# Patient Record
Sex: MALE | Race: WHITE | Employment: UNEMPLOYED | RURAL
[De-identification: names, ages, dates, MRNs, and addresses within clinical notes are randomized per-mention and may not be internally consistent; named-entity substitution may affect disease eponyms.]

---

## 2018-01-01 ENCOUNTER — HOSPITAL ENCOUNTER (INPATIENT)
Facility: HOSPITAL | Age: 78
LOS: 1 days | DRG: 194 | End: 2018-09-09
Attending: FAMILY MEDICINE | Admitting: INTERNAL MEDICINE
Payer: MEDICARE

## 2018-01-01 ENCOUNTER — APPOINTMENT (OUTPATIENT)
Dept: GENERAL RADIOLOGY | Facility: HOSPITAL | Age: 78
DRG: 194 | End: 2018-01-01
Payer: MEDICARE

## 2018-01-01 VITALS
DIASTOLIC BLOOD PRESSURE: 47 MMHG | TEMPERATURE: 96 F | SYSTOLIC BLOOD PRESSURE: 68 MMHG | OXYGEN SATURATION: 97 % | RESPIRATION RATE: 8 BRPM

## 2018-01-01 DIAGNOSIS — J18.9 PNEUMONIA DUE TO ORGANISM: Primary | ICD-10-CM

## 2018-01-01 LAB
A/G RATIO: 1 (ref 0.8–2)
ALBUMIN SERPL-MCNC: 3.6 G/DL (ref 3.4–4.8)
ALP BLD-CCNC: 73 U/L (ref 25–100)
ALT SERPL-CCNC: 8 U/L (ref 4–36)
ANION GAP SERPL CALCULATED.3IONS-SCNC: 12 MMOL/L (ref 3–16)
AST SERPL-CCNC: 16 U/L (ref 8–33)
BASE EXCESS ARTERIAL: 1.2 MMOL/L (ref -3–3)
BASOPHILS ABSOLUTE: 0.1 K/UL (ref 0–0.1)
BASOPHILS RELATIVE PERCENT: 0.3 %
BILIRUB SERPL-MCNC: <0.2 MG/DL (ref 0.3–1.2)
BILIRUBIN URINE: NEGATIVE
BLOOD, URINE: NEGATIVE
BUN BLDV-MCNC: 28 MG/DL (ref 6–20)
CALCIUM SERPL-MCNC: 9 MG/DL (ref 8.5–10.5)
CHLORIDE BLD-SCNC: 105 MMOL/L (ref 98–107)
CLARITY: CLEAR
CO2: 28 MMOL/L (ref 20–30)
COLOR: YELLOW
CREAT SERPL-MCNC: 1.5 MG/DL (ref 0.4–1.2)
EOSINOPHILS ABSOLUTE: 0.3 K/UL (ref 0–0.4)
EOSINOPHILS RELATIVE PERCENT: 1.1 %
GFR AFRICAN AMERICAN: 55
GFR NON-AFRICAN AMERICAN: 45
GLOBULIN: 3.6 G/DL
GLUCOSE BLD-MCNC: 103 MG/DL (ref 74–106)
GLUCOSE URINE: NEGATIVE MG/DL
HCO3 ARTERIAL: 28.7 MMOL/L (ref 22–26)
HCT VFR BLD CALC: 39.7 % (ref 40–54)
HEMOGLOBIN: 12.1 G/DL (ref 13–18)
IMMATURE GRANULOCYTES #: 0.2 K/UL
IMMATURE GRANULOCYTES %: 0.8 % (ref 0–5)
KETONES, URINE: NEGATIVE MG/DL
LEUKOCYTE ESTERASE, URINE: NEGATIVE
LYMPHOCYTES ABSOLUTE: 1.8 K/UL (ref 1.5–4)
LYMPHOCYTES RELATIVE PERCENT: 6.7 %
MCH RBC QN AUTO: 30.1 PG (ref 27–32)
MCHC RBC AUTO-ENTMCNC: 30.5 G/DL (ref 31–35)
MCV RBC AUTO: 98.8 FL (ref 80–100)
MICROSCOPIC EXAMINATION: YES
MONOCYTES ABSOLUTE: 1.4 K/UL (ref 0.2–0.8)
MONOCYTES RELATIVE PERCENT: 5.2 %
NEUTROPHILS ABSOLUTE: 23.5 K/UL (ref 2–7.5)
NEUTROPHILS RELATIVE PERCENT: 85.9 %
NITRITE, URINE: NEGATIVE
O2 SAT, ARTERIAL: 99.4 %
O2 THERAPY: ABNORMAL
PCO2 ARTERIAL: 59.4 MMHG (ref 35–45)
PDW BLD-RTO: 14.6 % (ref 11–16)
PH ARTERIAL: 7.3 (ref 7.35–7.45)
PH UA: 7
PLATELET # BLD: 286 K/UL (ref 150–400)
PMV BLD AUTO: 9.8 FL (ref 6–10)
PO2 ARTERIAL: 185.9 MMHG (ref 80–100)
POTASSIUM SERPL-SCNC: 3.6 MMOL/L (ref 3.4–5.1)
PROTEIN UA: ABNORMAL MG/DL
RBC # BLD: 4.02 M/UL (ref 4.5–6)
RBC UA: NORMAL /HPF (ref 0–2)
SODIUM BLD-SCNC: 145 MMOL/L (ref 136–145)
SPECIFIC GRAVITY UA: 1.02
TCO2 ARTERIAL: 30.5 MMOL/L
TOTAL PROTEIN: 7.2 G/DL (ref 6.4–8.3)
URINE REFLEX TO CULTURE: ABNORMAL
URINE TYPE: ABNORMAL
UROBILINOGEN, URINE: 0.2 E.U./DL
WBC # BLD: 27.3 K/UL (ref 4–11)
WBC UA: NORMAL /HPF (ref 0–5)

## 2018-01-01 PROCEDURE — 80053 COMPREHEN METABOLIC PANEL: CPT

## 2018-01-01 PROCEDURE — 81001 URINALYSIS AUTO W/SCOPE: CPT

## 2018-01-01 PROCEDURE — 93005 ELECTROCARDIOGRAM TRACING: CPT

## 2018-01-01 PROCEDURE — 96375 TX/PRO/DX INJ NEW DRUG ADDON: CPT

## 2018-01-01 PROCEDURE — 94640 AIRWAY INHALATION TREATMENT: CPT

## 2018-01-01 PROCEDURE — 31720 CLEARANCE OF AIRWAYS: CPT

## 2018-01-01 PROCEDURE — 87040 BLOOD CULTURE FOR BACTERIA: CPT

## 2018-01-01 PROCEDURE — 96376 TX/PRO/DX INJ SAME DRUG ADON: CPT

## 2018-01-01 PROCEDURE — 82803 BLOOD GASES ANY COMBINATION: CPT

## 2018-01-01 PROCEDURE — 6360000002 HC RX W HCPCS

## 2018-01-01 PROCEDURE — 36415 COLL VENOUS BLD VENIPUNCTURE: CPT

## 2018-01-01 PROCEDURE — 6370000000 HC RX 637 (ALT 250 FOR IP): Performed by: FAMILY MEDICINE

## 2018-01-01 PROCEDURE — 85025 COMPLETE CBC W/AUTO DIFF WBC: CPT

## 2018-01-01 PROCEDURE — 99285 EMERGENCY DEPT VISIT HI MDM: CPT

## 2018-01-01 PROCEDURE — 94660 CPAP INITIATION&MGMT: CPT

## 2018-01-01 PROCEDURE — 6360000002 HC RX W HCPCS: Performed by: FAMILY MEDICINE

## 2018-01-01 PROCEDURE — 6370000000 HC RX 637 (ALT 250 FOR IP): Performed by: INTERNAL MEDICINE

## 2018-01-01 PROCEDURE — 71045 X-RAY EXAM CHEST 1 VIEW: CPT

## 2018-01-01 PROCEDURE — 1200000000 HC SEMI PRIVATE

## 2018-01-01 PROCEDURE — 96365 THER/PROPH/DIAG IV INF INIT: CPT

## 2018-01-01 PROCEDURE — 99235 HOSP IP/OBS SAME DATE MOD 70: CPT | Performed by: INTERNAL MEDICINE

## 2018-01-01 PROCEDURE — 6360000002 HC RX W HCPCS: Performed by: INTERNAL MEDICINE

## 2018-01-01 PROCEDURE — 2580000003 HC RX 258: Performed by: FAMILY MEDICINE

## 2018-01-01 RX ORDER — IPRATROPIUM BROMIDE AND ALBUTEROL SULFATE 2.5; .5 MG/3ML; MG/3ML
1 SOLUTION RESPIRATORY (INHALATION) ONCE
Status: COMPLETED | OUTPATIENT
Start: 2018-01-01 | End: 2018-01-01

## 2018-01-01 RX ORDER — IPRATROPIUM BROMIDE AND ALBUTEROL SULFATE 2.5; .5 MG/3ML; MG/3ML
1 SOLUTION RESPIRATORY (INHALATION)
Status: DISCONTINUED | OUTPATIENT
Start: 2018-01-01 | End: 2018-01-01 | Stop reason: HOSPADM

## 2018-01-01 RX ORDER — FLUTICASONE PROPIONATE 50 MCG
1 SPRAY, SUSPENSION (ML) NASAL DAILY
Status: DISCONTINUED | OUTPATIENT
Start: 2018-01-01 | End: 2018-01-01 | Stop reason: HOSPADM

## 2018-01-01 RX ORDER — ONDANSETRON 2 MG/ML
4 INJECTION INTRAMUSCULAR; INTRAVENOUS EVERY 6 HOURS PRN
Status: DISCONTINUED | OUTPATIENT
Start: 2018-01-01 | End: 2018-01-01 | Stop reason: HOSPADM

## 2018-01-01 RX ORDER — KETOROLAC TROMETHAMINE 30 MG/ML
15 INJECTION, SOLUTION INTRAMUSCULAR; INTRAVENOUS ONCE
Status: COMPLETED | OUTPATIENT
Start: 2018-01-01 | End: 2018-01-01

## 2018-01-01 RX ORDER — BUDESONIDE 0.5 MG/2ML
500 INHALANT ORAL 2 TIMES DAILY
Status: DISCONTINUED | OUTPATIENT
Start: 2018-01-01 | End: 2018-01-01 | Stop reason: HOSPADM

## 2018-01-01 RX ORDER — MORPHINE SULFATE 4 MG/ML
INJECTION, SOLUTION INTRAMUSCULAR; INTRAVENOUS
Status: COMPLETED
Start: 2018-01-01 | End: 2018-01-01

## 2018-01-01 RX ORDER — VANCOMYCIN HYDROCHLORIDE 1 G/200ML
1000 INJECTION, SOLUTION INTRAVENOUS ONCE
Status: DISCONTINUED | OUTPATIENT
Start: 2018-01-01 | End: 2018-01-01 | Stop reason: HOSPADM

## 2018-01-01 RX ORDER — LEVOFLOXACIN 5 MG/ML
500 INJECTION, SOLUTION INTRAVENOUS ONCE
Status: COMPLETED | OUTPATIENT
Start: 2018-01-01 | End: 2018-01-01

## 2018-01-01 RX ORDER — SODIUM CHLORIDE 0.9 % (FLUSH) 0.9 %
10 SYRINGE (ML) INJECTION EVERY 12 HOURS SCHEDULED
Status: DISCONTINUED | OUTPATIENT
Start: 2018-01-01 | End: 2018-01-01 | Stop reason: HOSPADM

## 2018-01-01 RX ORDER — SODIUM CHLORIDE 9 MG/ML
INJECTION, SOLUTION INTRAVENOUS CONTINUOUS
Status: DISCONTINUED | OUTPATIENT
Start: 2018-01-01 | End: 2018-01-01

## 2018-01-01 RX ORDER — MORPHINE SULFATE 4 MG/ML
4 INJECTION, SOLUTION INTRAMUSCULAR; INTRAVENOUS ONCE
Status: COMPLETED | OUTPATIENT
Start: 2018-01-01 | End: 2018-01-01

## 2018-01-01 RX ORDER — LORAZEPAM 2 MG/ML
1 INJECTION INTRAMUSCULAR ONCE
Status: COMPLETED | OUTPATIENT
Start: 2018-01-01 | End: 2018-01-01

## 2018-01-01 RX ORDER — SODIUM CHLORIDE 0.9 % (FLUSH) 0.9 %
10 SYRINGE (ML) INJECTION PRN
Status: DISCONTINUED | OUTPATIENT
Start: 2018-01-01 | End: 2018-01-01 | Stop reason: HOSPADM

## 2018-01-01 RX ADMIN — LORAZEPAM 1 MG: 2 INJECTION INTRAMUSCULAR; INTRAVENOUS at 08:56

## 2018-01-01 RX ADMIN — LORAZEPAM 1 MG: 2 INJECTION INTRAMUSCULAR; INTRAVENOUS at 08:00

## 2018-01-01 RX ADMIN — SODIUM CHLORIDE: 9 INJECTION, SOLUTION INTRAVENOUS at 07:44

## 2018-01-01 RX ADMIN — BUDESONIDE 500 MCG: 0.5 SUSPENSION RESPIRATORY (INHALATION) at 13:56

## 2018-01-01 RX ADMIN — LEVOFLOXACIN 500 MG: 5 INJECTION, SOLUTION INTRAVENOUS at 08:58

## 2018-01-01 RX ADMIN — MORPHINE SULFATE 4 MG: 4 INJECTION INTRAVENOUS at 07:35

## 2018-01-01 RX ADMIN — KETOROLAC TROMETHAMINE 15 MG: 30 INJECTION, SOLUTION INTRAMUSCULAR at 08:57

## 2018-01-01 RX ADMIN — IPRATROPIUM BROMIDE AND ALBUTEROL SULFATE 1 AMPULE: .5; 3 SOLUTION RESPIRATORY (INHALATION) at 07:50

## 2018-01-01 RX ADMIN — MORPHINE SULFATE 4 MG: 4 INJECTION, SOLUTION INTRAMUSCULAR; INTRAVENOUS at 07:35

## 2018-01-01 RX ADMIN — IPRATROPIUM BROMIDE AND ALBUTEROL SULFATE 1 AMPULE: .5; 3 SOLUTION RESPIRATORY (INHALATION) at 13:56

## 2018-01-01 ASSESSMENT — PAIN SCALES - GENERAL
PAINLEVEL_OUTOF10: 0
PAINLEVEL_OUTOF10: 10

## 2018-01-01 ASSESSMENT — ENCOUNTER SYMPTOMS
SHORTNESS OF BREATH: 1
DIARRHEA: 0
COUGH: 1
VOMITING: 0
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
## 2018-09-09 PROBLEM — R09.02 HYPOXIA: Status: ACTIVE | Noted: 2018-01-01

## 2018-09-09 PROBLEM — I95.9 HYPOTENSION: Status: ACTIVE | Noted: 2018-01-01

## 2018-09-09 PROBLEM — J18.9 NOSOCOMIAL PNEUMONIA: Status: ACTIVE | Noted: 2018-01-01

## 2018-09-09 NOTE — DISCHARGE SUMMARY
1. Slight increased opacity in the left lateral costophrenic angle likely reflecting trace left pleural effusion and mild consolidation. 2. Increased interstitial markings in the lungs, stable. Assessment and Plan/Hospital course: Active Hospital Problems    Diagnosis Date Noted    Nosocomial pneumonia [J18.9] 2018    Hypotension [I95.9] 2018    Hypoxia [R09.02] 2018     I did see and examine the patient in the emergency room before transfer to the floor. Patient was hypotensive, no response to painful stimuli with fixed pupils. I did have long conversation with the son, the daughter who is the power of  in addition to questionable the wife regarding the type of care that they needing Mr. Sulma Pate to have. Discussed with them the only appropriate intervention at this time is palliative measures as recommended by ER physician. Patient was dependent on the BiPAP back rate with breathing and oxygenation. He was occasionally triggering the machine. All of them were in agreement to proceed with plan of care. Patient was transferred to the floor to be in a private room with palliative measures. Patient was admitted to the floor for 20-30 minutes before he quit breathing. All family members were in agreement with treatment plan. Disposition:      Signed:  Electronically signed by Elton Lombard, MD on 2018 at 6:39 PM       Thank you Corin Sumner MD for the opportunity to be involved in this patient's care. If you have any questions or concerns please feel free to contact me at [phone].

## 2018-09-09 NOTE — PROGRESS NOTES
Pt resting on Bipap. Pt still unresponsive, bp 73/44. Hr 105 rr 13. Pt still out of sync with bipap. Guppie breathing noted. Family now at bedside. -----CAROLANN Givens, ALEYDA----

## 2018-09-09 NOTE — ED NOTES
Nurse noted large swollen area / testicle. Dr Benny Lambert to room, states it is okay to place hebert, states area swollen is a hernia.      Celeste Landau, RN  09/09/18 4891

## 2018-09-09 NOTE — ED NOTES
Patient remains unresponsive to verbal stimuli, eyes open but does not follow nurses voice or movement. RR 25 with bipap intact.      Brandon Deleon RN  09/09/18 0131

## 2018-09-09 NOTE — FLOWSHEET NOTE
09/09/18 1408   Assessment   Charting Type Reassessment   Neurological   Neuro (WDL) X   Level of Consciousness 3   R Pupil Reaction Nonreactive   L Pupil Reaction Nonreactive   [place] Coma Scale   Eye Opening 1   Best Verbal Response 1   Best Motor Response 1   [place] Coma Scale Score 3   Respiratory   Respiratory (WDL) X   Respiratory Pattern Apneic   L Breath Sounds Absent   R Breath Sounds Absent   Cardiac   Heart Sounds Other (Comment)  (Absent)   Cardiac Rhythm Asystole   Rhythm Interpretation   Pulse (!) 0   Peripheral Vascular   R Carotid Pulse Absent   L Carotid Pulse Absent   RLE Neurovascular Assessment   Color Pale;Cyanotic

## 2018-09-09 NOTE — H&P
(TYLENOL WITH CODEINE #3 PO) Take by mouth    Historical Provider, MD   albuterol sulfate  (90 Base) MCG/ACT inhaler Inhale 2 puffs into the lungs every 6 hours as needed for Wheezing    Historical Provider, MD       Vitals:   Blood pressure was 67/40 pulse 90 respiration 13  O2 saturation 90% on BiPAP. vital signs reviewed in electronic chart    Physical exam  Constitutional:  Frail elderly male. Unresponsive. On BiPAP. Eyes:  Pupil 1 mm and nonreactive. conjunctiva normal  HENT:  Atraumatic, external ears normal, external nose/nares normal, oropharynx dry, no pharyngeal exudates. Neck:  Supple. No JVD or thyromegaly  Respiratory: Decreased breath sounds throughout. Bradypnea. Few crackles in the bases  Cardiovascular:  Slightly tachycardic, normal rhythm, no murmurs, no gallops, no rubs   GI:  Soft, nondistended, hypoactive bowel sounds, nontender, no organomegaly, no mass  Musculoskeletal:  No edema, , no obvious deformities. Unable to move extremities due to being unresponsive. Integument:  Dry skin, no rash. A few small ecchymotic area on upper extremities  Lymphatic:  No cervical or axillary lymphadenopathy noted   Neurologic:  Patient is flaccid. No response to painful stimuli. Psychiatric:  Difficult to assess due to being unresponsive.       Lab Results   Component Value Date    WBC 27.3 (H) 09/09/2018    HGB 12.1 (L) 09/09/2018    HCT 39.7 (L) 09/09/2018    MCV 98.8 09/09/2018     09/09/2018       Lab Results   Component Value Date     09/09/2018    K 3.6 09/09/2018     09/09/2018    CO2 28 09/09/2018    BUN 28 (H) 09/09/2018    CREATININE 1.5 (H) 09/09/2018    GLUCOSE 103 09/09/2018    CALCIUM 9.0 09/09/2018    PROT 7.2 09/09/2018    LABALBU 3.6 09/09/2018    BILITOT <0.2 (L) 09/09/2018    ALKPHOS 73 09/09/2018    AST 16 09/09/2018    ALT 8 09/09/2018    LABGLOM 45 (L) 09/09/2018    GFRAA 55 (L) 09/09/2018    AGRATIO 1.0 09/09/2018    GLOB 3.6 09/09/2018       PA/lat CXR: 1. Slight increased opacity in the left lateral costophrenic angle likely reflecting trace left pleural effusion and mild consolidation. 2. Increased interstitial markings in the lungs, stable. Assessment and Plan/Hospital course: Active Hospital Problems    Diagnosis Date Noted    Nosocomial pneumonia [J18.9] 2018    Hypotension [I95.9] 2018    Hypoxia [R09.02] 2018     I did see and examine the patient in the emergency room before transfer to the floor. Patient was hypotensive, no response to painful stimuli with fixed pupils. I did have long conversation with the son, the daughter who is the power of  in addition to questionable the wife regarding the type of care that they needing Mr. Michel Ying to have. Discussed with them the only appropriate intervention at this time is palliative measures as recommended by ER physician. Patient was dependent on the BiPAP back rate with breathing and oxygenation. He was occasionally triggering the machine. All of them were in agreement to proceed with plan of care. Patient was transferred to the floor to be in a private room with palliative measures. Patient was admitted to the floor for 20-30 minutes before he quit breathing. All family members were in agreement with treatment plan. Disposition:      Signed:  Electronically signed by Brisa Kevin MD on 2018 at 6:39 PM       Thank you Duran Philip MD for the opportunity to be involved in this patient's care. If you have any questions or concerns please feel free to contact me at [phone].

## 2018-09-09 NOTE — ED PROVIDER NOTES
daily    FERROUS SULFATE 325 (65 FE) MG TABLET    Take 325 mg by mouth daily (with breakfast)    FLUTICASONE (FLONASE) 50 MCG/ACT NASAL SPRAY    1 spray by Nasal route daily    GUAIFENESIN (MUCINEX) 600 MG EXTENDED RELEASE TABLET    Take 600 mg by mouth 2 times daily    MIRTAZAPINE (REMERON) 15 MG TABLET    Take 7.5 mg by mouth nightly    MONTELUKAST (SINGULAIR) 10 MG TABLET    Take 10 mg by mouth nightly    MULTIPLE VITAMINS-MINERALS (THERAPEUTIC MULTIVITAMIN-MINERALS) TABLET    Take 1 tablet by mouth daily    OMEPRAZOLE (PRILOSEC) 20 MG DELAYED RELEASE CAPSULE    Take 40 mg by mouth daily    PAROXETINE (PAXIL) 20 MG TABLET    Take 20 mg by mouth every morning    PREDNISONE (DELTASONE) 10 MG TABLET    Take 10 mg by mouth daily    RANITIDINE (ZANTAC) 150 MG CAPSULE    Take 150 mg by mouth 2 times daily    ROFLUMILAST (DALIRESP) 500 MCG TABLET    Take 500 mcg by mouth daily    TAMSULOSIN HCL (FLOMAX PO)    Take 0.4 mg by mouth    TIOTROPIUM (SPIRIVA RESPIMAT) 2.5 MCG/ACT AERS INHALER    Inhale 2 puffs into the lungs daily       ALLERGIES     Keflex [cephalexin] and Pcn [penicillins]    FAMILY HISTORY     History reviewed. No pertinent family history. SOCIAL HISTORY       Social History     Social History    Marital status:      Spouse name: N/A    Number of children: N/A    Years of education: N/A     Social History Main Topics    Smoking status: Unknown If Ever Smoked    Smokeless tobacco: None    Alcohol use None    Drug use: Unknown    Sexual activity: Not Asked     Other Topics Concern    None     Social History Narrative    None       SCREENINGS             PHYSICAL EXAM    (up to 7 for level 4, 8 or more for level 5)     ED Triage Vitals   BP Temp Temp src Pulse Resp SpO2 Height Weight   -- -- -- -- -- -- -- --       Physical Exam   Constitutional: He appears distressed. Elderly white male, awake, does not answer questions or follow commands. Eyes: No scleral icterus.    Neck: Normal specified.     DISCHARGE MEDICATIONS:  New Prescriptions    No medications on file       (Please note that portions of this note were completed with a voice recognition program.  Efforts were made to edit the dictations but occasionally words are mis-transcribed.)    Reyes Braxton MD (electronically signed)  Attending Emergency Physician          Reyes Braxton MD  09/09/18 2529

## 2018-09-09 NOTE — ED NOTES
[address] called and is updated on admission and prognosis. Spoke with [facility] DOTTIE Stark.      Franki Causey RN  09/09/18 9200

## 2018-09-09 NOTE — ED PROVIDER NOTES
range of motion. Neck supple. Cardiovascular: Regular rhythm. Tachycardia present. Exam reveals no gallop. No murmur heard. Pulmonary/Chest: He is in respiratory distress. He has rales. He exhibits no tenderness. Abdominal: Soft. Bowel sounds are normal. There is no tenderness. Genitourinary:   Genitourinary Comments: A large indirect right inguinal hernia is present. There is no evidence of entrapment. Skin: Skin is warm and dry. No rash noted. Nursing note and vitals reviewed. DIAGNOSTIC RESULTS     EKG: All EKG's are interpreted by the Emergency Department Physician who either signs or Co-signs this chart in the absence of a cardiologist.        RADIOLOGY:   Non-plain film images such as CT, Ultrasound and MRI are read by the radiologist. Plain radiographic images are visualized and preliminarily interpreted by the emergency physician with the below findings:        Interpretation per the Radiologist below, if available at the time of this note:    XR CHEST PORTABLE   Final Result   Impression:   1. Slight increased opacity in the left lateral costophrenic angle likely reflecting trace left pleural effusion and mild consolidation. 2. Increased interstitial markings in the lungs, stable.              ED BEDSIDE ULTRASOUND:   Performed by ED Physician - none    LABS:  Labs Reviewed   CBC WITH AUTO DIFFERENTIAL - Abnormal; Notable for the following:        Result Value    WBC 27.3 (*)     RBC 4.02 (*)     Hemoglobin 12.1 (*)     Hematocrit 39.7 (*)     MCHC 30.5 (*)     Neutrophils # 23.5 (*)     Monocytes # 1.4 (*)     All other components within normal limits    Narrative:     Performed at:  [address] [place] [address] Laboratory  [address] [place] [address],  [place], [place] [address]   Phone [phone]   COMPREHENSIVE METABOLIC PANEL - Abnormal; Notable for the following:     BUN 28 (*)     CREATININE 1.5 (*)     GFR Non- 45 (*)     GFR  55 (*) Total Bilirubin <0.2 (*)     All other components within normal limits    Narrative:     Performed at:  [facility]  [address] [place], [place] [address] [address]   Phone [phone]   URINE RT REFLEX TO CULTURE - Abnormal; Notable for the following:     Protein, UA TRACE (*)     All other components within normal limits    Narrative:     Performed at:  [address] [place] [address] Laboratory  [address] [place], [place] [address], [place] [address]   Phone [phone]   BLOOD GAS, ARTERIAL - Abnormal; Notable for the following:     pH, Arterial 7.302 (*)     pCO2, Arterial 59.4 (*)     pO2, Arterial 185.9 (*)     HCO3, Arterial 28.7 (*)     TCO2, Arterial 30.5 (*)     All other components within normal limits    Narrative:     Performed at:  [address] [place] [address] Laboratory  [address] [place], [place] [address] [address]   Phone [phone]   CULTURE BLOOD #1   MICROSCOPIC URINALYSIS    Narrative:     Performed at:  [address] [place] [address] Laboratory  [address] [place], [place] [address], [place] [address] [address]   Phone [phone]       All other labs were within normal range or not returned as of this dictation. EMERGENCY DEPARTMENT COURSE and DIFFERENTIAL DIAGNOSIS/MDM:   Vitals:    Vitals:    09/09/18 0722 09/09/18 0750 09/09/18 0819   BP: (!) 147/129     Pulse: 153     Resp: 26  28   Temp: 102.2 °F (39 °C)     TempSrc: Oral     SpO2: 90% 94%            CRITICAL CARE TIME   Total Critical Care time was 0 minutes, excluding separately reportable procedures. There was a high probability of clinically significant/life threatening deterioration in the patient's condition which required my urgent intervention. CONSULTS:  I spoke with the patient's daughter Cherise Dorsey, who is the power of  for the patient. . I explained the gravity of his condition and that we would do everything we can aside from placing him on a ventilator or performing

## 2018-09-09 NOTE — PLAN OF CARE
Problem: Coping:  Goal: Family's ability to cope with current situation will improve  Family's ability to cope with current situation will improve  Outcome: Completed Date Met: 09/09/18

## 2018-09-09 NOTE — FLOWSHEET NOTE
Pt admit from ED, from [facility], with report of Respiratory destress, per report pt is unresponsive at this time, on arrival eyes open but did not follow commands or respond in ED, Pt is Unresponsive at this time. Pt continues on  BiPaP as on in ED. See above flow sheet insert for assessment. Family educated on pt and grieving process. Flores Caba at bedside and family aware. Will notify Provider that pt has arrived. Will continue to monitor.         09/09/18 1340   Assessment   Charting Type Admission   Neurological   Neuro (WDL) X   Level of Consciousness 3   Orientation Level LOGAN   Cognition LOGAN   Language LOGAN   Size R Pupil (mm) 2   R Pupil Shape Round   R Pupil Reaction Sluggish   Size L Pupil (mm) 2   L Pupil Shape Round   L Pupil Reaction Sluggish   R Hand  LOGAN  (Pt does not follow commands)   L Hand  LOGAN   R Foot Dorsiflexion LOGAN   L Foot Dorsiflexion LOGAN   R Foot Plantar Flexion LOGAN   L Foot Plantar Flexion LOGAN   RUE Motor Response No movement to painful stimulus   Sensation RUE LOGAN   LUE Motor Response No movement to painful stimulus   Sensation LUE LOGAN   RLE Motor Response No movement to painful stimulus   Sensation RLE LOGAN   LLE Motor Response No movement to painful stimulus   Sensation LLE LOGAN   Gag LOGAN  (Pt requiring continues BiPap)   [place] Coma Scale   Eye Opening 1   Best Verbal Response 1   Best Motor Response 1   [place] Coma Scale Score 3   Respiratory   Respiratory (WDL) X   Respiratory Pattern Bradypneic;Irregular   Respiratory Depth Shallow   Respiratory Quality/Effort Labored   Breath Sounds   Right Upper Lobe Diminished   Right Middle Lobe Diminished   Right Lower Lobe Diminished   Left Upper Lobe Diminished   Left Lower Lobe Diminished   Cardiac   Cardiac (WDL) X   Cardiac Regularity Irregular   Heart Sounds S1, S2   Cardiac Rhythm NSR;SB   Rhythm Interpretation   Pulse 60   Cardiac Monitor   Telemetry Monitor On Yes   Telemetry Audible Yes   Telemetry Alarms Set Yes

## 2018-09-09 NOTE — ED NOTES
Patient to Medical Unit per stretcher / bipap after registration completes admission.      Brandon Deleon RN  09/09/18 1826

## 2018-09-09 NOTE — ED NOTES
POA signs DNR. Dr Randy Tan to patient's room to speak with them at this time.      Sharda Medina RN  09/09/18 2400

## 2018-09-09 NOTE — CONSULTS
Pharmacy to dose Vanco:  Vanco 1gm IV ordered x1 now to provide Gram+ organism coverage to include mRSA. Will check a Random Vanco with Am labs and review renal function and dosing at that time. If renal function stable, may calculate a scheduled dose. BUN 28, SCr 1.5, WBC 27.3. Will request a HT/ WT be entered. CBC and BMP on order.   Garland Story PharmD 9/9/2018 1:56 PM

## 2018-09-14 LAB — BLOOD CULTURE, ROUTINE: NORMAL
